# Patient Record
Sex: FEMALE | Race: WHITE
[De-identification: names, ages, dates, MRNs, and addresses within clinical notes are randomized per-mention and may not be internally consistent; named-entity substitution may affect disease eponyms.]

---

## 2022-07-08 ENCOUNTER — HOSPITAL ENCOUNTER (INPATIENT)
Dept: HOSPITAL 54 - ER | Age: 37
LOS: 1 days | Discharge: HOME | DRG: 812 | End: 2022-07-09
Attending: INTERNAL MEDICINE | Admitting: INTERNAL MEDICINE
Payer: MEDICAID

## 2022-07-08 VITALS — HEIGHT: 63 IN | BODY MASS INDEX: 20.38 KG/M2 | WEIGHT: 115 LBS
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
## 2022-07-08 DIAGNOSIS — E87.6: ICD-10-CM

## 2022-07-08 DIAGNOSIS — F32.A: ICD-10-CM

## 2022-07-08 DIAGNOSIS — G92.8: ICD-10-CM

## 2022-07-08 DIAGNOSIS — K21.9: ICD-10-CM

## 2022-07-08 DIAGNOSIS — Z20.822: ICD-10-CM

## 2022-07-08 DIAGNOSIS — E44.0: ICD-10-CM

## 2022-07-08 DIAGNOSIS — T39.1X1A: Primary | ICD-10-CM

## 2022-07-08 DIAGNOSIS — E87.0: ICD-10-CM

## 2022-07-08 LAB
ALBUMIN SERPL BCP-MCNC: 4.4 G/DL (ref 3.4–5)
ALP SERPL-CCNC: 67 U/L (ref 46–116)
ALT SERPL W P-5'-P-CCNC: 13 U/L (ref 12–78)
APAP SERPL-MCNC: 168 UG/ML (ref 10–30)
AST SERPL W P-5'-P-CCNC: 15 U/L (ref 15–37)
BASOPHILS # BLD AUTO: 0 K/UL (ref 0–0.2)
BASOPHILS NFR BLD AUTO: 0.2 % (ref 0–2)
BILIRUB DIRECT SERPL-MCNC: 0.1 MG/DL (ref 0–0.2)
BILIRUB SERPL-MCNC: 0.4 MG/DL (ref 0.2–1)
BILIRUB UR QL STRIP: NEGATIVE
BUN SERPL-MCNC: 14 MG/DL (ref 7–18)
CALCIUM SERPL-MCNC: 8.5 MG/DL (ref 8.5–10.1)
CHLORIDE SERPL-SCNC: 105 MMOL/L (ref 98–107)
CO2 SERPL-SCNC: 24 MMOL/L (ref 21–32)
COLOR UR: (no result)
CREAT SERPL-MCNC: 0.7 MG/DL (ref 0.6–1.3)
EOSINOPHIL NFR BLD AUTO: 1 % (ref 0–6)
ETHANOL SERPL-MCNC: < 3 MG/DL (ref 0–0)
GLUCOSE SERPL-MCNC: 104 MG/DL (ref 74–106)
GLUCOSE UR STRIP-MCNC: NEGATIVE MG/DL
HCT VFR BLD AUTO: 39 % (ref 33–45)
HGB BLD-MCNC: 13.4 G/DL (ref 11.5–14.8)
LEUKOCYTE ESTERASE UR QL STRIP: NEGATIVE
LYMPHOCYTES NFR BLD AUTO: 1.9 K/UL (ref 0.8–4.8)
LYMPHOCYTES NFR BLD AUTO: 26.9 % (ref 20–44)
MCHC RBC AUTO-ENTMCNC: 34 G/DL (ref 31–36)
MCV RBC AUTO: 89 FL (ref 82–100)
MONOCYTES NFR BLD AUTO: 0.5 K/UL (ref 0.1–1.3)
MONOCYTES NFR BLD AUTO: 7.2 % (ref 2–12)
NEUTROPHILS # BLD AUTO: 4.6 K/UL (ref 1.8–8.9)
NEUTROPHILS NFR BLD AUTO: 64.7 % (ref 43–81)
NITRITE UR QL STRIP: NEGATIVE
PH UR STRIP: 5.5 [PH] (ref 5–8)
PLATELET # BLD AUTO: 311 K/UL (ref 150–450)
POTASSIUM SERPL-SCNC: 3.1 MMOL/L (ref 3.5–5.1)
PROT SERPL-MCNC: 7.9 G/DL (ref 6.4–8.2)
PROT UR QL STRIP: NEGATIVE MG/DL
RBC # BLD AUTO: 4.44 MIL/UL (ref 4–5.2)
RBC #/AREA URNS HPF: (no result) /HPF (ref 0–2)
SODIUM SERPL-SCNC: 139 MMOL/L (ref 136–145)
UROBILINOGEN UR STRIP-MCNC: 0.2 EU/DL
WBC #/AREA URNS HPF: (no result) /HPF (ref 0–3)
WBC NRBC COR # BLD AUTO: 7.1 K/UL (ref 4.3–11)

## 2022-07-08 PROCEDURE — G0480 DRUG TEST DEF 1-7 CLASSES: HCPCS

## 2022-07-08 PROCEDURE — C9803 HOPD COVID-19 SPEC COLLECT: HCPCS

## 2022-07-08 PROCEDURE — C9113 INJ PANTOPRAZOLE SODIUM, VIA: HCPCS

## 2022-07-08 PROCEDURE — G0378 HOSPITAL OBSERVATION PER HR: HCPCS

## 2022-07-08 NOTE — NUR
CONTACTED POISON CONTROL, SPOKE WITH BERNARD PHARMACIST. GAVE INFO ON 
ACETAMINOPHEN , IBUPROFEN, AND BENADRYL SUSPECTED OVERDOSE. INSTRUCTIONS GIVEN 
REGARDING SUPPORTIVE CARE.

## 2022-07-08 NOTE — NUR
PT UNABLE TO PROVIDE URINE SAMPLE AT THIS TIME AND REFUSES IN AND OUT CATHETER. 
WILL ATEMPT COLLECTION AT A LATER TIME.

## 2022-07-09 VITALS — SYSTOLIC BLOOD PRESSURE: 90 MMHG | DIASTOLIC BLOOD PRESSURE: 55 MMHG

## 2022-07-09 VITALS — DIASTOLIC BLOOD PRESSURE: 56 MMHG | SYSTOLIC BLOOD PRESSURE: 97 MMHG

## 2022-07-09 VITALS — DIASTOLIC BLOOD PRESSURE: 49 MMHG | SYSTOLIC BLOOD PRESSURE: 96 MMHG

## 2022-07-09 LAB
ALBUMIN SERPL BCP-MCNC: 2.8 G/DL (ref 3.4–5)
ALBUMIN SERPL BCP-MCNC: 3.1 G/DL (ref 3.4–5)
ALBUMIN SERPL BCP-MCNC: 3.1 G/DL (ref 3.4–5)
ALP SERPL-CCNC: 44 U/L (ref 46–116)
ALP SERPL-CCNC: 48 U/L (ref 46–116)
ALP SERPL-CCNC: 49 U/L (ref 46–116)
ALT SERPL W P-5'-P-CCNC: 14 U/L (ref 12–78)
ALT SERPL W P-5'-P-CCNC: 17 U/L (ref 12–78)
ALT SERPL W P-5'-P-CCNC: 19 U/L (ref 12–78)
AST SERPL W P-5'-P-CCNC: 11 U/L (ref 15–37)
AST SERPL W P-5'-P-CCNC: 12 U/L (ref 15–37)
AST SERPL W P-5'-P-CCNC: 16 U/L (ref 15–37)
BILIRUB DIRECT SERPL-MCNC: 0.1 MG/DL (ref 0–0.2)
BILIRUB SERPL-MCNC: 0.2 MG/DL (ref 0.2–1)
BILIRUB SERPL-MCNC: 0.3 MG/DL (ref 0.2–1)
BILIRUB SERPL-MCNC: 0.3 MG/DL (ref 0.2–1)
BUN SERPL-MCNC: 3 MG/DL (ref 7–18)
BUN SERPL-MCNC: 4 MG/DL (ref 7–18)
CALCIUM SERPL-MCNC: 7.1 MG/DL (ref 8.5–10.1)
CALCIUM SERPL-MCNC: 7.5 MG/DL (ref 8.5–10.1)
CHLORIDE SERPL-SCNC: 111 MMOL/L (ref 98–107)
CHLORIDE SERPL-SCNC: 111 MMOL/L (ref 98–107)
CO2 SERPL-SCNC: 23 MMOL/L (ref 21–32)
CO2 SERPL-SCNC: 23 MMOL/L (ref 21–32)
CREAT SERPL-MCNC: 0.6 MG/DL (ref 0.6–1.3)
CREAT SERPL-MCNC: 0.6 MG/DL (ref 0.6–1.3)
GLUCOSE SERPL-MCNC: 106 MG/DL (ref 74–106)
GLUCOSE SERPL-MCNC: 126 MG/DL (ref 74–106)
POTASSIUM SERPL-SCNC: 3.2 MMOL/L (ref 3.5–5.1)
POTASSIUM SERPL-SCNC: 3.3 MMOL/L (ref 3.5–5.1)
PROT SERPL-MCNC: 5.5 G/DL (ref 6.4–8.2)
PROT SERPL-MCNC: 6.1 G/DL (ref 6.4–8.2)
PROT SERPL-MCNC: 6.1 G/DL (ref 6.4–8.2)
SODIUM SERPL-SCNC: 141 MMOL/L (ref 136–145)
SODIUM SERPL-SCNC: 143 MMOL/L (ref 136–145)

## 2022-07-09 NOTE — NUR
RN OPENING NOTE





PATIENT RECEIVED IN BED SLEEPING. NO S/SX OF RESPIRATORY DISTRESS OR SOB. NO C/O PAIN. IV 
ACCESS TO LAC & R-HAND BOTH INTACT, AND PATIENT WITH FLUIDS FLOWING WELL. SAFETY MEASURES IN 
PLACE WITH BED IN LOWEST POSITION AND LOCKED. SIDE RAILS UP X2 AND CALL LIGHT WITHIN REACH. 
WILL CONTINUE TO MONITOR.

## 2022-07-09 NOTE — NUR
MS RN DISCHARGE NOTE 



PATIENT ALERT/ORIENTED X 3, PT PRIMARILY [place] SPEAKING HOWEVER  AT BEDSIDE SPEAKS 
ENGLISH AND ABLE TO TRANSLATE. PATIENT DENIES SUICIDE IDEATION. ACETAMINOPHEN LEVELS DOWN TO 
1. DISCHARGE CHARGE PAPERS EXPLAINED TO  AND PATIENT, NEW PRESCRIPTION EXPLAINED AS 
WELL, BOTH VERBALIZED UNDERSTANDING. DISCHARGE PAPERS SIGNED BY PATIENT AND COPY GIVEN TO 
PATIENT. ALL BELONGINGS TAKEN BY PATIENT AND BELONGINGS LIST SIGNED BY PATIENT. IV ON RIGHT 
HAND AND LEFT AC REMOVED WITH MINIMAL BLEEDING, PRESSURE AND GAUZE APPLIED. PATIENT 
AMBULATORY. PATIENT PICKED UP BY  FOR DISCHARGE HOME, PATIENT AND  WALKED DOWN 
TO HOSPITAL ENTRANCE BY CNA.

## 2022-07-09 NOTE — NUR
SPOKE TO GINA WITH POISON CONTROL. WAS INFORMED TO CONTINUE MUCOMYST DRIP AND 
PERFORM REPEAT TYLENOL AND LFTS PRIOR TO LAST BAG OF ACETADOTE.

## 2022-07-09 NOTE — NUR
RN MS NOTES

PT IN BED, AWAKE, ALERT AND ORIENTED, DENIES PAIN, NOT IN DISTRESS, DENIES SUICIDAL 
IDEATION, ABLE TO WALK TO THE BATHROOM WITH STEADY GAIT, SEEN BY DR. BECERRA, CLEARED BY 
PSYCH, ALSO SEEN AND EXAMINED BY DR. GREENE WITH AN , DISCHARGE ORDER GIVEN, 
DISCHARGE AND MEDICATION INSTRUCTIONS PROVIDED TO PT, VERBALIZED UNDERSTANDING, PER POISON 
CONTROL WANTS A NEGATIVE TYLENOL LEVEL BEFORE DISCHARGE, LAB ORDERED, PT WILL BE PICKED UP 
BY , ENDORSED TO NIGHT SHIFT NURSE.

## 2022-07-09 NOTE — NUR
ADMISSION NOTES



PT ARRIVED VIA GURNEY AT APPROXIMATELY 0400 WITH EMT. ABLE TO AMBULATE TO BED. AOx4, ABLE TO 
MAKE NEEDS KNOWN. ON RA AND TOLERATING WELL. NO SOB NOTED. NO S/SX OF RESPIRATORY DISTRESS 
NOTED. IV ACCESS IN R HAND #20G AND LAC #18G RUNNING NS @100 ML/HR AND MUCOMYST @ 62.5 
ML/HR. SAFETY PRECAUTIONS IN PLACE: BED IN LOWEST, LOCKED POSITION, SIDERAILS UPx2, AND 
BRAKES ON. TABLE AND CALL LIGHT WITHIN REACH. WILL CONTINUE TO MONITOR. Patient with cough, sputum production and infiltrate on X-ray as well as leukocytosis.     S/p augmentin and now transitioned to Doxy and Flagyl given recently diagnosed H. Pylori.     Recommendation:  - Pneumonia will be covered with current Abx regiment for H. Pylori.   - MRSA swab negative.  - Please obtain repeat CXR (no repeat since 6/16) as she had a new LLL infiltrate on last film.   - please ensure follow up with Dr. Marquez on discharge.

## 2022-07-09 NOTE — NUR
RN CLOSING NOTES



PT IN BED, ASLEEP, ON SIDE. AOx4, ABLE TO MAKE NEEDS KNOWN. ON RA AND TOLERATING WELL. NO 
SOB NOTED. NO S/SX OF RESPIRATORY DISTRESS NOTED. IV ACCESS IN R HAND #20G AND LAC #18G 
RUNNING NS @100 ML/HR AND MUCOMYST @ 62.5 ML/HR. ALL ORDERS CARRIED OUT. ALL NEEDS MET. PT 
KEPT CLEAN AND DRY. SAFETY PRECAUTIONS IN PLACE: BED IN LOWEST, LOCKED POSITION, SIDERAILS 
UPx2, AND BRAKES ON. TABLE AND CALL LIGHT WITHIN REACH. WILL ENDORSE TO ONCOMING SHIFT FOR 
NATASHA.

## 2023-03-10 ENCOUNTER — HOSPITAL ENCOUNTER (EMERGENCY)
Dept: HOSPITAL 54 - ER | Age: 38
LOS: 1 days | Discharge: HOME | End: 2023-03-11
Payer: MEDICAID

## 2023-03-10 VITALS — HEIGHT: 64 IN | WEIGHT: 110 LBS | BODY MASS INDEX: 18.78 KG/M2

## 2023-03-10 VITALS — SYSTOLIC BLOOD PRESSURE: 90 MMHG | DIASTOLIC BLOOD PRESSURE: 68 MMHG

## 2023-03-10 DIAGNOSIS — S16.1XXA: ICD-10-CM
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
## 2023-03-10 DIAGNOSIS — R07.89: Primary | ICD-10-CM
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
